# Patient Record
Sex: MALE | Race: WHITE | Employment: FULL TIME | URBAN - METROPOLITAN AREA
[De-identification: names, ages, dates, MRNs, and addresses within clinical notes are randomized per-mention and may not be internally consistent; named-entity substitution may affect disease eponyms.]

---

## 2021-11-22 ENCOUNTER — APPOINTMENT (OUTPATIENT)
Dept: CT IMAGING | Age: 56
DRG: 897 | End: 2021-11-22
Attending: STUDENT IN AN ORGANIZED HEALTH CARE EDUCATION/TRAINING PROGRAM

## 2021-11-22 ENCOUNTER — HOSPITAL ENCOUNTER (INPATIENT)
Age: 56
LOS: 1 days | Discharge: HOME | DRG: 897 | End: 2021-11-23
Attending: STUDENT IN AN ORGANIZED HEALTH CARE EDUCATION/TRAINING PROGRAM | Admitting: INTERNAL MEDICINE
Payer: COMMERCIAL

## 2021-11-22 ENCOUNTER — APPOINTMENT (OUTPATIENT)
Dept: MRI IMAGING | Age: 56
DRG: 897 | End: 2021-11-22
Attending: INTERNAL MEDICINE

## 2021-11-22 DIAGNOSIS — R47.01 EXPRESSIVE APHASIA: ICD-10-CM

## 2021-11-22 DIAGNOSIS — R20.0 LEFT FACIAL NUMBNESS: ICD-10-CM

## 2021-11-22 DIAGNOSIS — I63.9 ACUTE ISCHEMIC STROKE (HCC): Primary | ICD-10-CM

## 2021-11-22 DIAGNOSIS — I10 HYPERTENSION, UNSPECIFIED TYPE: ICD-10-CM

## 2021-11-22 PROBLEM — F10.10 ALCOHOL ABUSE: Status: ACTIVE | Noted: 2021-11-22

## 2021-11-22 PROBLEM — R47.9 SPEECH ABNORMALITY: Status: ACTIVE | Noted: 2021-11-22

## 2021-11-22 PROBLEM — R53.1 WEAKNESS: Status: ACTIVE | Noted: 2021-11-22

## 2021-11-22 LAB
ALBUMIN SERPL-MCNC: 3.8 G/DL (ref 3.5–5)
ALBUMIN/GLOB SERPL: 1.1 {RATIO} (ref 1.1–2.2)
ALP SERPL-CCNC: 83 U/L (ref 45–117)
ALT SERPL-CCNC: 40 U/L (ref 12–78)
ANION GAP SERPL CALC-SCNC: 9 MMOL/L (ref 5–15)
AST SERPL-CCNC: 45 U/L (ref 15–37)
BASOPHILS # BLD: 0.1 K/UL (ref 0–0.1)
BASOPHILS NFR BLD: 1 % (ref 0–1)
BILIRUB SERPL-MCNC: 0.4 MG/DL (ref 0.2–1)
BUN SERPL-MCNC: 7 MG/DL (ref 6–20)
BUN/CREAT SERPL: 9 (ref 12–20)
CALCIUM SERPL-MCNC: 8.8 MG/DL (ref 8.5–10.1)
CHLORIDE SERPL-SCNC: 103 MMOL/L (ref 97–108)
CO2 SERPL-SCNC: 25 MMOL/L (ref 21–32)
COMMENT, HOLDF: NORMAL
CREAT SERPL-MCNC: 0.82 MG/DL (ref 0.7–1.3)
DIFFERENTIAL METHOD BLD: ABNORMAL
EOSINOPHIL # BLD: 0.2 K/UL (ref 0–0.4)
EOSINOPHIL NFR BLD: 2 % (ref 0–7)
ERYTHROCYTE [DISTWIDTH] IN BLOOD BY AUTOMATED COUNT: 12.7 % (ref 11.5–14.5)
GLOBULIN SER CALC-MCNC: 3.5 G/DL (ref 2–4)
GLUCOSE BLD STRIP.AUTO-MCNC: 125 MG/DL (ref 65–117)
GLUCOSE BLD STRIP.AUTO-MCNC: 126 MG/DL (ref 65–117)
GLUCOSE BLD STRIP.AUTO-MCNC: 149 MG/DL (ref 65–117)
GLUCOSE SERPL-MCNC: 138 MG/DL (ref 65–100)
HCT VFR BLD AUTO: 43.9 % (ref 36.6–50.3)
HGB BLD-MCNC: 15.4 G/DL (ref 12.1–17)
IMM GRANULOCYTES # BLD AUTO: 0 K/UL (ref 0–0.04)
IMM GRANULOCYTES NFR BLD AUTO: 0 % (ref 0–0.5)
INR PPP: 1 (ref 0.9–1.1)
LYMPHOCYTES # BLD: 1.4 K/UL (ref 0.8–3.5)
LYMPHOCYTES NFR BLD: 20 % (ref 12–49)
MCH RBC QN AUTO: 31.3 PG (ref 26–34)
MCHC RBC AUTO-ENTMCNC: 35.1 G/DL (ref 30–36.5)
MCV RBC AUTO: 89.2 FL (ref 80–99)
MONOCYTES # BLD: 0.8 K/UL (ref 0–1)
MONOCYTES NFR BLD: 11 % (ref 5–13)
NEUTS SEG # BLD: 4.8 K/UL (ref 1.8–8)
NEUTS SEG NFR BLD: 66 % (ref 32–75)
NRBC # BLD: 0 K/UL (ref 0–0.01)
NRBC BLD-RTO: 0 PER 100 WBC
PLATELET # BLD AUTO: 168 K/UL (ref 150–400)
PMV BLD AUTO: 8.5 FL (ref 8.9–12.9)
POTASSIUM SERPL-SCNC: 3.9 MMOL/L (ref 3.5–5.1)
PROT SERPL-MCNC: 7.3 G/DL (ref 6.4–8.2)
PROTHROMBIN TIME: 10.7 SEC (ref 9–11.1)
RBC # BLD AUTO: 4.92 M/UL (ref 4.1–5.7)
SAMPLES BEING HELD,HOLD: NORMAL
SERVICE CMNT-IMP: ABNORMAL
SODIUM SERPL-SCNC: 137 MMOL/L (ref 136–145)
WBC # BLD AUTO: 7.3 K/UL (ref 4.1–11.1)

## 2021-11-22 PROCEDURE — 4A03X5D MEASUREMENT OF ARTERIAL FLOW, INTRACRANIAL, EXTERNAL APPROACH: ICD-10-PCS | Performed by: INTERNAL MEDICINE

## 2021-11-22 PROCEDURE — 96374 THER/PROPH/DIAG INJ IV PUSH: CPT

## 2021-11-22 PROCEDURE — 70498 CT ANGIOGRAPHY NECK: CPT

## 2021-11-22 PROCEDURE — 99285 EMERGENCY DEPT VISIT HI MDM: CPT

## 2021-11-22 PROCEDURE — 70551 MRI BRAIN STEM W/O DYE: CPT

## 2021-11-22 PROCEDURE — 74011250636 HC RX REV CODE- 250/636: Performed by: STUDENT IN AN ORGANIZED HEALTH CARE EDUCATION/TRAINING PROGRAM

## 2021-11-22 PROCEDURE — 82962 GLUCOSE BLOOD TEST: CPT

## 2021-11-22 PROCEDURE — 65660000000 HC RM CCU STEPDOWN

## 2021-11-22 PROCEDURE — 80053 COMPREHEN METABOLIC PANEL: CPT

## 2021-11-22 PROCEDURE — 0042T CT CODE NEURO PERF W CBF: CPT

## 2021-11-22 PROCEDURE — 80074 ACUTE HEPATITIS PANEL: CPT

## 2021-11-22 PROCEDURE — 85610 PROTHROMBIN TIME: CPT

## 2021-11-22 PROCEDURE — 93005 ELECTROCARDIOGRAM TRACING: CPT

## 2021-11-22 PROCEDURE — 74011250637 HC RX REV CODE- 250/637: Performed by: STUDENT IN AN ORGANIZED HEALTH CARE EDUCATION/TRAINING PROGRAM

## 2021-11-22 PROCEDURE — 74011250636 HC RX REV CODE- 250/636: Performed by: INTERNAL MEDICINE

## 2021-11-22 PROCEDURE — 85025 COMPLETE CBC W/AUTO DIFF WBC: CPT

## 2021-11-22 PROCEDURE — 74011250637 HC RX REV CODE- 250/637: Performed by: INTERNAL MEDICINE

## 2021-11-22 PROCEDURE — 36415 COLL VENOUS BLD VENIPUNCTURE: CPT

## 2021-11-22 PROCEDURE — 74011000636 HC RX REV CODE- 636: Performed by: RADIOLOGY

## 2021-11-22 PROCEDURE — 70450 CT HEAD/BRAIN W/O DYE: CPT

## 2021-11-22 RX ORDER — ACETAMINOPHEN 325 MG/1
650 TABLET ORAL
Status: DISCONTINUED | OUTPATIENT
Start: 2021-11-22 | End: 2021-11-23 | Stop reason: HOSPADM

## 2021-11-22 RX ORDER — LORAZEPAM 2 MG/ML
4 INJECTION INTRAMUSCULAR
Status: DISCONTINUED | OUTPATIENT
Start: 2021-11-22 | End: 2021-11-23 | Stop reason: HOSPADM

## 2021-11-22 RX ORDER — GUAIFENESIN 100 MG/5ML
81 LIQUID (ML) ORAL DAILY
Status: DISCONTINUED | OUTPATIENT
Start: 2021-11-23 | End: 2021-11-23 | Stop reason: HOSPADM

## 2021-11-22 RX ORDER — LABETALOL HCL 20 MG/4 ML
10 SYRINGE (ML) INTRAVENOUS
Status: COMPLETED | OUTPATIENT
Start: 2021-11-22 | End: 2021-11-22

## 2021-11-22 RX ORDER — FOLIC ACID 1 MG/1
1 TABLET ORAL DAILY
Status: DISCONTINUED | OUTPATIENT
Start: 2021-11-22 | End: 2021-11-23 | Stop reason: HOSPADM

## 2021-11-22 RX ORDER — PHENOBARBITAL SODIUM 65 MG/ML
65 INJECTION INTRAMUSCULAR 3 TIMES DAILY
Status: DISCONTINUED | OUTPATIENT
Start: 2021-11-22 | End: 2021-11-23

## 2021-11-22 RX ORDER — METOPROLOL SUCCINATE 100 MG/1
100 TABLET, EXTENDED RELEASE ORAL DAILY
COMMUNITY

## 2021-11-22 RX ORDER — METFORMIN HYDROCHLORIDE 500 MG/1
500 TABLET ORAL
COMMUNITY

## 2021-11-22 RX ORDER — ACETAMINOPHEN 650 MG/1
650 SUPPOSITORY RECTAL
Status: DISCONTINUED | OUTPATIENT
Start: 2021-11-22 | End: 2021-11-23 | Stop reason: HOSPADM

## 2021-11-22 RX ORDER — ASPIRIN 325 MG/1
100 TABLET, FILM COATED ORAL DAILY
Status: DISCONTINUED | OUTPATIENT
Start: 2021-11-22 | End: 2021-11-23 | Stop reason: HOSPADM

## 2021-11-22 RX ORDER — METOPROLOL SUCCINATE 25 MG/1
25 TABLET, EXTENDED RELEASE ORAL DAILY
COMMUNITY
End: 2021-11-22

## 2021-11-22 RX ORDER — LISINOPRIL 10 MG/1
10 TABLET ORAL DAILY
COMMUNITY

## 2021-11-22 RX ORDER — DEXTROSE 50 % IN WATER (D50W) INTRAVENOUS SYRINGE
12.5-25 AS NEEDED
Status: DISCONTINUED | OUTPATIENT
Start: 2021-11-22 | End: 2021-11-23 | Stop reason: HOSPADM

## 2021-11-22 RX ORDER — DIAZEPAM 10 MG/2ML
5 INJECTION INTRAMUSCULAR
Status: COMPLETED | OUTPATIENT
Start: 2021-11-22 | End: 2021-11-22

## 2021-11-22 RX ORDER — GUAIFENESIN 100 MG/5ML
324 LIQUID (ML) ORAL
Status: COMPLETED | OUTPATIENT
Start: 2021-11-22 | End: 2021-11-22

## 2021-11-22 RX ORDER — LABETALOL HCL 20 MG/4 ML
5 SYRINGE (ML) INTRAVENOUS
Status: DISCONTINUED | OUTPATIENT
Start: 2021-11-22 | End: 2021-11-23 | Stop reason: HOSPADM

## 2021-11-22 RX ORDER — INSULIN LISPRO 100 [IU]/ML
INJECTION, SOLUTION INTRAVENOUS; SUBCUTANEOUS
Status: DISCONTINUED | OUTPATIENT
Start: 2021-11-22 | End: 2021-11-23 | Stop reason: HOSPADM

## 2021-11-22 RX ORDER — MAGNESIUM SULFATE 100 %
4 CRYSTALS MISCELLANEOUS AS NEEDED
Status: DISCONTINUED | OUTPATIENT
Start: 2021-11-22 | End: 2021-11-23 | Stop reason: HOSPADM

## 2021-11-22 RX ORDER — LORAZEPAM 2 MG/ML
2 INJECTION INTRAMUSCULAR
Status: DISCONTINUED | OUTPATIENT
Start: 2021-11-22 | End: 2021-11-23 | Stop reason: HOSPADM

## 2021-11-22 RX ADMIN — Medication 100 MG: at 17:13

## 2021-11-22 RX ADMIN — ASPIRIN 324 MG: 81 TABLET, CHEWABLE ORAL at 17:11

## 2021-11-22 RX ADMIN — IOPAMIDOL 100 ML: 755 INJECTION, SOLUTION INTRAVENOUS at 15:29

## 2021-11-22 RX ADMIN — PHENOBARBITAL SODIUM 65 MG: 65 INJECTION INTRAMUSCULAR at 22:44

## 2021-11-22 RX ADMIN — LABETALOL HYDROCHLORIDE 10 MG: 5 INJECTION, SOLUTION INTRAVENOUS at 14:40

## 2021-11-22 RX ADMIN — FOLIC ACID 1 MG: 1 TABLET ORAL at 17:13

## 2021-11-22 RX ADMIN — PHENOBARBITAL SODIUM 65 MG: 65 INJECTION INTRAMUSCULAR at 17:14

## 2021-11-22 RX ADMIN — DIAZEPAM 5 MG: 5 INJECTION, SOLUTION INTRAMUSCULAR; INTRAVENOUS at 17:07

## 2021-11-22 NOTE — PROGRESS NOTES
Spiritual Care Assessment/Progress Note  [address]      NAME: Yao Lopez      MRN: [id number]  AGE: 60 y.o. SEX: male  Mosque Affiliation:    Language:      11/22/2021     Total Time (in minutes): 14     Spiritual Assessment begun in [facility] EMERGENCY DEPT through conversation with:         []Patient        [] Family    [] Friend(s)        Reason for Consult: Other (comment) (Code Stroke)     Spiritual beliefs: (Please include comment if needed)     [] Identifies with a lashon tradition:         [] Supported by a lashon community:            [] Claims no spiritual orientation:           [] Seeking spiritual identity:                [] Adheres to an individual form of spirituality:           [x] Not able to assess:                           Identified resources for coping:      [] Prayer                               [] Music                  [] Guided Imagery     [] Family/friends                 [] Pet visits     [] Devotional reading                         [x] Unknown     [] Other:                                        Interventions offered during this visit: (See comments for more details)    Patient Interventions: Other (comment) (Unable to assess)           Plan of Care:     [] Support spiritual and/or cultural needs    [] Support AMD and/or advance care planning process      [] Support grieving process   [] Coordinate Rites and/or Rituals    [] Coordination with community clergy   [] No spiritual needs identified at this time   [] Detailed Plan of Care below (See Comments)  [] Make referral to Music Therapy  [] Make referral to Pet Therapy     [] Make referral to Addiction services  [] Make referral to [facility]  [] Make referral to Spiritual Care Partner  [] No future visits requested        [x] Contact Spiritual Care for further referrals     Comments:  responded to a Code Stroke for Mr. Bety Carroll in the ER.  Mr. Bety Carroll was assessed by the physician and quickly went to CT for testing. Unable to assess at this time. 's are available for further support upon referral  Katiuska Tyler. Elian Garcia.      Paging Service: [phone])

## 2021-11-22 NOTE — ED TRIAGE NOTES
Signs and symptoms: Slurred speech/aphasia, left facial numbness, bilateral leg numbness  VAN score: Negative  Last Known Well: 1100  Blood Glucose (EMS acceptable): 115   Blood Pressure (EMS acceptable): 210/100  Anticoagulants: none     Code Stroke Level 1 initiated at this time. SBAR handoff provided to N/A. This RN transporting pt to CT at this time. Will hand off care to Brenda Valente, primary RN once pt returned to ER.

## 2021-11-22 NOTE — H&P
Pete Issa david [place] [address]  [address] [facility], [address] [place] [address]  [phone]    Admission History and Physical      NAME:  Molly Martino   :   1965   MRN:  [id number]     PCP:  Tiffanie Colby MD     Date/Time of service:  2021          Subjective:     CHIEF COMPLAINT: Left-sided facial numbness    HISTORY OF PRESENT ILLNESS:     Mr. Gilford Coats is a 60 y.o.   male past medical history of diabetes, hypertension and alcohol abuse who is admitted for stroke rule out. Mr. 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 He also endorses feeling lightheaded and bilateral weakness in his lower extremities. He denied any syncopal episodes, denies any chest pain, denies any focal weakness. He does endorse to drinking 8-12 beers daily. He states his last drink was last night. He endorses some mild tremors, mild headache; no hallucinations. No Known Allergies    Prior to Admission medications    Not on File       Past Medical History:   Diagnosis Date    [facility]     Diabetes ([facility]     HTN (hypertension)         Past Surgical History:   Procedure Laterality Date    HX OTHER SURGICAL      Right sided jaw surgery       Social History     Tobacco Use    Smoking status: Former Smoker    Smokeless tobacco: Never Used   Substance Use Topics    Alcohol use:  Yes     Alcohol/week: 70.0 standard drinks     Types: 70 Cans of beer per week        Family History   Problem Relation Age of Onset    Diabetes Mother     Hypertension Father         Review of Systems:  Per HPI             Objective:      VITALS:    Vital signs reviewed; most recent are:    Visit Vitals  BP (!) 189/119 (BP 1 Location: Left upper arm, BP Patient Position: At rest)   Pulse 81   Temp 98.9 °F (37.2 °C)   Resp 20   Ht 6' 1\" (1.854 m)   Wt 105.3 kg (232 lb 2.3 oz)   SpO2 96%   BMI 30.63 kg/m²     SpO2 Readings from Last 6 Encounters:   11/22/21 96%        No intake or output data in the 24 hours ending 11/22/21 1651     Exam:     Physical Exam:    Gen:  Well-developed, well-nourished, in no acute distress  HEENT:  Pink conjunctivae, PERRL, hearing intact to voice, moist mucous membranes  Neck:  Supple, no tenderness to palpation   Resp:  No accessory muscle use, clear breath sounds without wheezes rales or rhonchi  Card:  RRR, No murmurs, normal S1, S2, no peripheral edema  Abd:  Soft, non-tender, non-distended, normoactive bowel sounds are present, no palpable organomegaly   Lymph:  No cervical adenopathy  Musc:  No cyanosis or clubbing  Skin:  No rashes or ulcers, skin turgor is good  Neuro:  Cranial nerves 3-12 are grossly intact, strength 5/5 bilaterally UEs and LEs, follows commands appropriately  Psych:  Oriented to person, place, and time, Alert with good insight      Labs:    Recent Labs     11/22/21  1432   WBC 7.3   HGB 15.4   HCT 43.9        Recent Labs     11/22/21  1432      K 3.9      CO2 25   *   BUN 7   CREA 0.82   CA 8.8   ALB 3.8   TBILI 0.4   ALT 40     Lab Results   Component Value Date/Time    Glucose (POC) 149 (H) 11/22/2021 02:50 PM     No results for input(s): PH, PCO2, PO2, HCO3, FIO2 in the last 72 hours. Recent Labs     11/22/21  1432   INR 1.0       Radiology and EKG reviewed: CT head without any acute concerns, CTA head and neck with no significant stenosis or occlusions. EKG sinus with some occasional PVCs     Old Records reviewed in [facility]       Assessment/Plan:           Left facial numbness (11/22/2021)/ Speech abnormality (11/22/2021)/ Weakness (11/22/2021): Concern for stroke. CT head without any acute concerns. CTA head and neck with no significant stenosis or occlusions. Obtain Mri head wo. Obtain echo. Obtain UDS. Obtain A1C and lipid panel. Daily aspirin. Consult neurology.  Speech/PT/OT      Alcohol abuse (11/22/2021): endorses 8-12 beers daily. Last drink about 24 hours ago; now with mild sx. Start phenobarbital. CIWA with IV ativan PRN. Oral folic acid and thiamine. HTN: allow permissive HTN. Hold home metoprolol and lisinopril for now. Diabetes: hold home metormin. ISS.            Risk of deterioration: high      Total time spent with patient: 48 Minutes **I personally saw and examined the patient during this time period**                 Care Plan discussed with: Patient and Nursing Staff    Discussed:  Code Status and Care Plan    Prophylaxis:  SCD's    Probable Disposition:  Home w/Family           ___________________________________________________    Attending Physician: Dominic Sarah DO

## 2021-11-22 NOTE — ED PROVIDER NOTES
Chief Complaint   Patient presents with    Numbness    Aphasia     This is a 53-year-old male with a history of diabetes presenting by EMS for slurred speech, expressive aphasia, and left sided facial numbness that started abruptly at 1100 (when he was last know normal). He was driving down [address] when he started feeling lightheaded like he was about to pass out, pulled over at a car dealership at which time EMS was called. Accucheck in the field was normal, last blood pressure in the field was 741'P systolic. He continues to have speech deficits although medics felt that there was slight improvement on arrival compared to his initial exam.  Reports continued left sided facial numbness, speech deficits, also reports bilateral lower extremity weakness. Denies any history of prior stroke. Not anticoagulated. No headache, neck pain, vision loss, or vomiting. No other systemic complaints. Symptoms are moderate in nature without alleviating factors. Past Medical History:   Diagnosis Date    [facility]     Diabetes ([facility]     HTN (hypertension)        Past Surgical History:   Procedure Laterality Date    HX OTHER SURGICAL      Right sided jaw surgery         History reviewed. No pertinent family history. Social History     Socioeconomic History    Marital status:      Spouse name: Not on file    Number of children: Not on file    Years of education: Not on file    Highest education level: Not on file   Occupational History    Not on file   Tobacco Use    Smoking status: Former Smoker    Smokeless tobacco: Never Used   Substance and Sexual Activity    Alcohol use:  Yes     Alcohol/week: 70.0 standard drinks     Types: 70 Cans of beer per week    Drug use: Never    Sexual activity: Not on file   Other Topics Concern    Not on file   Social History Narrative    Not on file     Social Determinants of Health     Financial Resource Strain:     Difficulty of Paying Living Expenses: Not on file   Food Insecurity:     Worried About Running Out of Food in the Last Year: Not on file    Brandy of Food in the Last Year: Not on file   Transportation Needs:     Lack of Transportation (Medical): Not on file    Lack of Transportation (Non-Medical): Not on file   Physical Activity:     Days of Exercise per Week: Not on file    Minutes of Exercise per Session: Not on file   Stress:     Feeling of Stress : Not on file   Social Connections:     Frequency of Communication with Friends and Family: Not on file    Frequency of Social Gatherings with Friends and Family: Not on file    Attends Jewish Services: Not on file    Active Member of [address] [place] [address] [email or web address] or Organizations: Not on file    Attends Club or Organization Meetings: Not on file    Marital Status: Not on file   Intimate Partner Violence:     Fear of Current or Ex-Partner: Not on file    Emotionally Abused: Not on file    Physically Abused: Not on file    Sexually Abused: Not on file   Housing Stability:     Unable to Pay for Housing in the Last Year: Not on file    Number of [place] in the Last Year: Not on file    Unstable Housing in the Last Year: Not on file         ALLERGIES: Patient has no known allergies. Review of Systems   Constitutional: Negative for fever. HENT: Negative for facial swelling. Eyes: Negative for redness. Respiratory: Negative for shortness of breath. Cardiovascular: Negative for chest pain. Gastrointestinal: Negative for abdominal pain and vomiting. Musculoskeletal: Negative for neck pain. Skin: Negative for color change. Neurological: Positive for numbness. Psychiatric/Behavioral: Negative for confusion.        Vitals:    11/22/21 1427 11/22/21 1437 11/22/21 1440   BP: (!) 189/119     Pulse: 91  81   Resp: 20     Temp: 98.9 °F (37.2 °C)     SpO2: 94% 96%    Weight: 105.3 kg (232 lb 2.3 oz)     Height: 6' 1\" (1.854 m)              Physical Exam  General:  Awake and alert, NAD  HEENT:  NC/AT, equal pupils, moist mucous membranes  Neck:   Normal inspection, full range of motion  Cardiac:  RRR, no murmurs  Respiratory:  Clear bilaterally, no wheezes, rales, rhonchi  Abdomen:  Soft and nontender, nondistended  Extremities: Warm and well perfused, no peripheral edema  Neuro:  +Mild dysarthria and expressive aphasia, reports left sided facial numbness, exhibits left pronator drift, bilateral lower extremity weakness (strength 4/5 bilaterally, but weaker on the left)  Skin:   No rashes or pallor    RESULTS  Recent Results (from the past 12 hour(s))   CBC WITH AUTOMATED DIFF    Collection Time: 11/22/21  2:32 PM   Result Value Ref Range    WBC 7.3 4.1 - 11.1 K/uL    RBC 4.92 4.10 - 5.70 M/uL    HGB 15.4 12.1 - 17.0 g/dL    HCT 43.9 36.6 - 50.3 %    MCV 89.2 80.0 - 99.0 FL    MCH 31.3 26.0 - 34.0 PG    MCHC 35.1 30.0 - 36.5 g/dL    RDW 12.7 11.5 - 14.5 %    PLATELET 729 974 - 207 K/uL    MPV 8.5 (L) 8.9 - 12.9 FL    NRBC 0.0 0  WBC    ABSOLUTE NRBC 0.00 0.00 - 0.01 K/uL    NEUTROPHILS 66 32 - 75 %    LYMPHOCYTES 20 12 - 49 %    MONOCYTES 11 5 - 13 %    EOSINOPHILS 2 0 - 7 %    BASOPHILS 1 0 - 1 %    IMMATURE GRANULOCYTES 0 0.0 - 0.5 %    ABS. NEUTROPHILS 4.8 1.8 - 8.0 K/UL    ABS. LYMPHOCYTES 1.4 0.8 - 3.5 K/UL    ABS. MONOCYTES 0.8 0.0 - 1.0 K/UL    ABS. EOSINOPHILS 0.2 0.0 - 0.4 K/UL    ABS. BASOPHILS 0.1 0.0 - 0.1 K/UL    ABS. IMM.  GRANS. 0.0 0.00 - 0.04 K/UL    DF AUTOMATED     METABOLIC PANEL, COMPREHENSIVE    Collection Time: 11/22/21  2:32 PM   Result Value Ref Range    Sodium 137 136 - 145 mmol/L    Potassium 3.9 3.5 - 5.1 mmol/L    Chloride 103 97 - 108 mmol/L    CO2 25 21 - 32 mmol/L    Anion gap 9 5 - 15 mmol/L    Glucose 138 (H) 65 - 100 mg/dL    BUN 7 6 - 20 MG/DL    Creatinine 0.82 0.70 - 1.30 MG/DL    BUN/Creatinine ratio 9 (L) 12 - 20      GFR est AA >60 >60 ml/min/1.73m2    GFR est non-AA >60 >60 ml/min/1.73m2    Calcium 8.8 8.5 - 10.1 MG/DL    Bilirubin, total 0.4 0.2 - 1.0 MG/DL    ALT (SGPT) 40 12 - 78 U/L    AST (SGOT) 45 (H) 15 - 37 U/L    Alk. phosphatase 83 45 - 117 U/L    Protein, total 7.3 6.4 - 8.2 g/dL    Albumin 3.8 3.5 - 5.0 g/dL    Globulin 3.5 2.0 - 4.0 g/dL    A-G Ratio 1.1 1.1 - 2.2     PROTHROMBIN TIME + INR    Collection Time: 11/22/21  2:32 PM   Result Value Ref Range    INR 1.0 0.9 - 1.1      Prothrombin time 10.7 9.0 - 11.1 sec   SAMPLES BEING HELD    Collection Time: 11/22/21  2:32 PM   Result Value Ref Range    SAMPLES BEING HELD 1SST,1RED,1DRK GRN     COMMENT        Add-on orders for these samples will be processed based on acceptable specimen integrity and analyte stability, which may vary by analyte. GLUCOSE, POC    Collection Time: 11/22/21  2:50 PM   Result Value Ref Range    Glucose (POC) 149 (H) 65 - 117 mg/dL    Performed by Sarah Stout    EKG, 12 LEAD, INITIAL    Collection Time: 11/22/21  2:52 PM   Result Value Ref Range    Ventricular Rate 85 BPM    Atrial Rate 85 BPM    P-R Interval 158 ms    QRS Duration 154 ms    Q-T Interval 420 ms    QTC Calculation (Bezet) 499 ms    Calculated P Axis 46 degrees    Calculated R Axis -21 degrees    Calculated T Axis 114 degrees    Diagnosis       Sinus rhythm with occasional premature ventricular complexes  Left bundle branch block  Abnormal ECG  No previous ECGs available          IMAGING  CTA CODE NEURO HEAD AND NECK W CONT    Result Date: 11/22/2021  CTA Head: 1. No evidence of significant stenosis or aneurysm. CTA Neck: 1. No evidence of significant stenosis. CT Brain Perfusion: 1. No acute abnormality. CT CODE NEURO HEAD WO CONTRAST    Result Date: 11/22/2021  1. No evidence of acute intracranial abnormality. CT CODE NEURO PERF W CBF    Result Date: 11/22/2021  CTA Head: 1. No evidence of significant stenosis or aneurysm. CTA Neck: 1. No evidence of significant stenosis. CT Brain Perfusion: 1. No acute abnormality.        Procedures - none unless documented below  EKG as interpreted by me: Normal sinus rhythm at a rate of 85, left axis deviation, left bundle branch block, no ischemic changes by Sgarbossa criteria. ED course: Labs, EKG and imaging reviewed. NIHSS score of 5 on my initial exam.  Received a call back from teleneurology Neyda Tao at 2860, discussed his initial presentation while the patient was having his head CT performed, communicated that he is a candidate for systemic TPA given positive NIH stroke score with potentially disabling neurologic deficits on exam, and last known normal time <4.5 hours prior to arrival.  Labetalol 10 mg IV given for diastolic blood pressure of 119 with possibility of needing TPA. Discussed again with Dr. Lakeshia Rushing at 4100, given rapidly resolving deficits (NIHSS of 1 at the time of his exam) he did not feel the patient was an appropriate candidate for thrombolytics based on risk-benefit. CT head/CTA head and neck unremarkable, I've ordered aspirin 324 mg. Will admit for continued management including MRI and stroke investigation, discussed with the hospitalist.    Hospitalist Chris Serve for Admission  4:13 PM    ED Room Number:   ER10/10  Patient Name and age:  Joselo Levi 60 y.o.  male  Working Diagnosis:     1. Acute ischemic stroke ([address]    2. Hypertension, unspecified type    3. Expressive aphasia    4. Left facial numbness      COVID suspicion:   no  Code Status:    Full Code  Readmission:    no  Isolation Requirements:  no  Recommended Level of Care: telemetry  Department:    [facility] ED - [phone]  Other:     Given rapidly resolving deficits, teleneurology did not feel he was an appropriate candidate for thrombolytics. CT head/CTA head and neck unremarkable, aspirin given, needs admission for MRI and stroke investigation.

## 2021-11-23 ENCOUNTER — APPOINTMENT (OUTPATIENT)
Dept: NON INVASIVE DIAGNOSTICS | Age: 56
DRG: 897 | End: 2021-11-23
Attending: INTERNAL MEDICINE

## 2021-11-23 VITALS
BODY MASS INDEX: 29.51 KG/M2 | HEIGHT: 73 IN | TEMPERATURE: 98.9 F | WEIGHT: 222.66 LBS | SYSTOLIC BLOOD PRESSURE: 132 MMHG | DIASTOLIC BLOOD PRESSURE: 70 MMHG | RESPIRATION RATE: 17 BRPM | OXYGEN SATURATION: 94 % | HEART RATE: 70 BPM

## 2021-11-23 LAB
ALBUMIN SERPL-MCNC: 3.6 G/DL (ref 3.5–5)
ALBUMIN/GLOB SERPL: 1.1 {RATIO} (ref 1.1–2.2)
ALP SERPL-CCNC: 73 U/L (ref 45–117)
ALT SERPL-CCNC: 36 U/L (ref 12–78)
AMPHET UR QL SCN: NEGATIVE
ANION GAP SERPL CALC-SCNC: 8 MMOL/L (ref 5–15)
AST SERPL-CCNC: 35 U/L (ref 15–37)
AV R PG: 53.94 MMHG
BARBITURATES UR QL SCN: NEGATIVE
BENZODIAZ UR QL: NEGATIVE
BILIRUB SERPL-MCNC: 0.9 MG/DL (ref 0.2–1)
BUN SERPL-MCNC: 7 MG/DL (ref 6–20)
BUN/CREAT SERPL: 9 (ref 12–20)
CALCIUM SERPL-MCNC: 9.1 MG/DL (ref 8.5–10.1)
CANNABINOIDS UR QL SCN: NEGATIVE
CHLORIDE SERPL-SCNC: 105 MMOL/L (ref 97–108)
CHOLEST SERPL-MCNC: 219 MG/DL
CO2 SERPL-SCNC: 26 MMOL/L (ref 21–32)
COCAINE UR QL SCN: NEGATIVE
CREAT SERPL-MCNC: 0.77 MG/DL (ref 0.7–1.3)
DRUG SCRN COMMENT,DRGCM: NORMAL
ECHO AO ASC DIAM: 3.59 CM
ECHO AR MAX VEL PISA: 367.21 CM/S
ECHO AV ANNULUS DIAM: 4.06 CM
ECHO AV AREA PEAK VELOCITY: 3.88 CM2
ECHO AV AREA/BSA PEAK VELOCITY: 1.7 CM2/M2
ECHO AV PEAK GRADIENT: 8.49 MMHG
ECHO AV PEAK VELOCITY: 145.52 CM/S
ECHO AV REGURGITANT PHT: 806.11 MS
ECHO IVC PROX: 2.06 CM
ECHO LA AREA 4C: 14.45 CM2
ECHO LA MAJOR AXIS: 3.54 CM
ECHO LA MINOR AXIS: 1.57 CM
ECHO LA VOL 2C: 44.19 ML (ref 18–58)
ECHO LA VOL 4C: 36.1 ML (ref 18–58)
ECHO LA VOL BP: 45.87 ML (ref 18–58)
ECHO LA VOL/BSA BIPLANE: 20.39 ML/M2 (ref 16–28)
ECHO LA VOLUME INDEX A2C: 19.64 ML/M2 (ref 16–28)
ECHO LA VOLUME INDEX A4C: 16.04 ML/M2 (ref 16–28)
ECHO LV E' LATERAL VELOCITY: 6.66 CM/S
ECHO LV E' SEPTAL VELOCITY: 6.13 CM/S
ECHO LV INTERNAL DIMENSION DIASTOLIC: 5.76 CM (ref 4.2–5.9)
ECHO LV INTERNAL DIMENSION SYSTOLIC: 3.16 CM
ECHO LV IVSD: 1.01 CM (ref 0.6–1)
ECHO LV MASS 2D: 233.4 G (ref 88–224)
ECHO LV MASS INDEX 2D: 103.7 G/M2 (ref 49–115)
ECHO LV POSTERIOR WALL DIASTOLIC: 1.01 CM (ref 0.6–1)
ECHO LVOT CARDIAC OUTPUT: 22.99 LITER/MINUTE
ECHO LVOT DIAM: 2.39 CM
ECHO LVOT PEAK GRADIENT: 6.31 MMHG
ECHO LVOT PEAK VELOCITY: 125.61 CM/S
ECHO LVOT SV: 110.9 ML
ECHO LVOT VTI: 24.65 CM
ECHO MV A VELOCITY: 84.95 CM/S
ECHO MV E DECELERATION TIME (DT): 230.85 MS
ECHO MV E VELOCITY: 55.13 CM/S
ECHO MV E/A RATIO: 0.65
ECHO MV E/E' LATERAL: 8.28
ECHO MV E/E' RATIO (AVERAGED): 8.64
ECHO MV E/E' SEPTAL: 8.99
ECHO PV MAX VELOCITY: 133.12 CM/S
ECHO PV PEAK INSTANTANEOUS GRADIENT SYSTOLIC: 7.09 MMHG
ECHO RV INTERNAL DIMENSION: 3.7 CM
ECHO RV TAPSE: 1.81 CM (ref 1.5–2)
ECHO TV REGURGITANT MAX VELOCITY: 232.8 CM/S
ECHO TV REGURGITANT PEAK GRADIENT: 21.68 MMHG
ERYTHROCYTE [DISTWIDTH] IN BLOOD BY AUTOMATED COUNT: 12.9 % (ref 11.5–14.5)
EST. AVERAGE GLUCOSE BLD GHB EST-MCNC: 143 MG/DL
GLOBULIN SER CALC-MCNC: 3.2 G/DL (ref 2–4)
GLUCOSE BLD STRIP.AUTO-MCNC: 129 MG/DL (ref 65–117)
GLUCOSE BLD STRIP.AUTO-MCNC: 139 MG/DL (ref 65–117)
GLUCOSE BLD STRIP.AUTO-MCNC: 171 MG/DL (ref 65–117)
GLUCOSE SERPL-MCNC: 130 MG/DL (ref 65–100)
HAV IGM SER QL: NONREACTIVE
HBA1C MFR BLD: 6.6 % (ref 4–5.6)
HBV CORE IGM SER QL: NONREACTIVE
HBV SURFACE AG SER QL: <0.1 INDEX
HBV SURFACE AG SER QL: NEGATIVE
HCT VFR BLD AUTO: 43.8 % (ref 36.6–50.3)
HCV AB SERPL QL IA: NONREACTIVE
HDLC SERPL-MCNC: 67 MG/DL
HDLC SERPL: 3.3 {RATIO} (ref 0–5)
HGB BLD-MCNC: 15.2 G/DL (ref 12.1–17)
LA VOL DISK BP: 40.46 ML (ref 18–58)
LDLC SERPL CALC-MCNC: 124 MG/DL (ref 0–100)
LVOT MG: 3.47 MMHG
MCH RBC QN AUTO: 31.1 PG (ref 26–34)
MCHC RBC AUTO-ENTMCNC: 34.7 G/DL (ref 30–36.5)
MCV RBC AUTO: 89.8 FL (ref 80–99)
METHADONE UR QL: NEGATIVE
NRBC # BLD: 0 K/UL (ref 0–0.01)
NRBC BLD-RTO: 0 PER 100 WBC
OPIATES UR QL: NEGATIVE
PCP UR QL: NEGATIVE
PLATELET # BLD AUTO: 167 K/UL (ref 150–400)
PMV BLD AUTO: 9.3 FL (ref 8.9–12.9)
POTASSIUM SERPL-SCNC: 3.7 MMOL/L (ref 3.5–5.1)
PROT SERPL-MCNC: 6.8 G/DL (ref 6.4–8.2)
RBC # BLD AUTO: 4.88 M/UL (ref 4.1–5.7)
SERVICE CMNT-IMP: ABNORMAL
SODIUM SERPL-SCNC: 139 MMOL/L (ref 136–145)
SP1: NORMAL
SP2: NORMAL
SP3: NORMAL
TRIGL SERPL-MCNC: 140 MG/DL (ref ?–150)
VLDLC SERPL CALC-MCNC: 28 MG/DL
WBC # BLD AUTO: 7.3 K/UL (ref 4.1–11.1)

## 2021-11-23 PROCEDURE — 96374 THER/PROPH/DIAG INJ IV PUSH: CPT

## 2021-11-23 PROCEDURE — 80061 LIPID PANEL: CPT

## 2021-11-23 PROCEDURE — 99223 1ST HOSP IP/OBS HIGH 75: CPT | Performed by: PSYCHIATRY & NEUROLOGY

## 2021-11-23 PROCEDURE — 74011250636 HC RX REV CODE- 250/636: Performed by: INTERNAL MEDICINE

## 2021-11-23 PROCEDURE — 80307 DRUG TEST PRSMV CHEM ANLYZR: CPT

## 2021-11-23 PROCEDURE — 82962 GLUCOSE BLOOD TEST: CPT

## 2021-11-23 PROCEDURE — 74011636637 HC RX REV CODE- 636/637: Performed by: INTERNAL MEDICINE

## 2021-11-23 PROCEDURE — 80053 COMPREHEN METABOLIC PANEL: CPT

## 2021-11-23 PROCEDURE — 74011250637 HC RX REV CODE- 250/637: Performed by: INTERNAL MEDICINE

## 2021-11-23 PROCEDURE — 97530 THERAPEUTIC ACTIVITIES: CPT

## 2021-11-23 PROCEDURE — 97112 NEUROMUSCULAR REEDUCATION: CPT

## 2021-11-23 PROCEDURE — 97535 SELF CARE MNGMENT TRAINING: CPT

## 2021-11-23 PROCEDURE — 85027 COMPLETE CBC AUTOMATED: CPT

## 2021-11-23 PROCEDURE — 97161 PT EVAL LOW COMPLEX 20 MIN: CPT

## 2021-11-23 PROCEDURE — 93306 TTE W/DOPPLER COMPLETE: CPT | Performed by: INTERNAL MEDICINE

## 2021-11-23 PROCEDURE — 36415 COLL VENOUS BLD VENIPUNCTURE: CPT

## 2021-11-23 PROCEDURE — 83036 HEMOGLOBIN GLYCOSYLATED A1C: CPT

## 2021-11-23 PROCEDURE — 97165 OT EVAL LOW COMPLEX 30 MIN: CPT

## 2021-11-23 RX ORDER — LISINOPRIL 5 MG/1
10 TABLET ORAL DAILY
Status: DISCONTINUED | OUTPATIENT
Start: 2021-11-23 | End: 2021-11-23 | Stop reason: HOSPADM

## 2021-11-23 RX ORDER — METOPROLOL SUCCINATE 50 MG/1
100 TABLET, EXTENDED RELEASE ORAL DAILY
Status: DISCONTINUED | OUTPATIENT
Start: 2021-11-23 | End: 2021-11-23 | Stop reason: HOSPADM

## 2021-11-23 RX ADMIN — Medication 100 MG: at 09:51

## 2021-11-23 RX ADMIN — METOPROLOL SUCCINATE 100 MG: 50 TABLET, EXTENDED RELEASE ORAL at 12:12

## 2021-11-23 RX ADMIN — INSULIN LISPRO 2 UNITS: 100 INJECTION, SOLUTION INTRAVENOUS; SUBCUTANEOUS at 12:12

## 2021-11-23 RX ADMIN — PERFLUTREN 2 ML: 6.52 INJECTION, SUSPENSION INTRAVENOUS at 11:28

## 2021-11-23 RX ADMIN — FOLIC ACID 1 MG: 1 TABLET ORAL at 09:51

## 2021-11-23 RX ADMIN — LISINOPRIL 10 MG: 5 TABLET ORAL at 12:13

## 2021-11-23 RX ADMIN — ASPIRIN 81 MG: 81 TABLET, CHEWABLE ORAL at 09:51

## 2021-11-23 RX ADMIN — PHENOBARBITAL SODIUM 65 MG: 65 INJECTION INTRAMUSCULAR at 09:51

## 2021-11-23 NOTE — CONSULTS
013 [facility] and [address] at [facility] at [facility] [address], [address] [facility] [address] [facility], [address] [place] [address]  [phone] Office  [phone] Facsimile           Referring: Dr. Leora Wu    Chief Complaint   Patient presents with    Numbness    Aphasia     We are asked to see this 57-year-old gentleman in neurologic consultation regarding slurred speech and left-sided numbness question stroke. He presented to the emergency department with slurred speech difficulty getting out of his words left-sided facial numbness starting at 11:00 while driving. He felt like he was going to pass out. Apparently had a normal Accu-Chek in the field with a blood pressure in the 140s. In the emergency department his examination demonstrated left pronator drift and bilateral lower extremity weakness but worse on the left. 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 He did forget to take his medicine yesterday morning but he has done that in the past on occasion and just took it when he got home and had no effects from it. In any regard he just suddenly started to feel dizzy described as lightheaded. He felt like he was going to pass out. He had some numbness to the left side of his face. He felt weak in all of his extremities. He felt so lightheaded that he again thought he was going to pass out so he pulled over on the side of the road and had EMS summoned. He sat on the side of the road until EMS got there.   He started to feel better in the emergency department. He has never had this happen before. He did not lose consciousness. There was nothing different about yesterday except for getting his medicine. His sugars have been running well he says. He has not been ill. No injury. He did nothing different yesterday. He denies any hemibody weakness.       Past Medical History:   Diagnosis Date    [facility]ism ([facility]     Diabetes ([facility]     HTN (hypertension)        Past Surgical History:   Procedure Laterality Date    HX OTHER SURGICAL      Right sided jaw surgery       Current Facility-Administered Medications   Medication Dose Route Frequency Provider Last Rate Last Admin    acetaminophen (TYLENOL) tablet 650 mg  650 mg Oral Q4H PRN Brunilda Corpus, DO        Or    acetaminophen (TYLENOL) solution 650 mg  650 mg Per NG tube Q4H PRN Brunilda Corpus, DO        Or    acetaminophen (TYLENOL) suppository 650 mg  650 mg Rectal Q4H PRN Brunilda Corpus, DO        aspirin chewable tablet 81 mg  81 mg Oral DAILY Al, Darline, DO        labetaloL (NORMODYNE;TRANDATE) 20 mg/4 mL (5 mg/mL) injection 5 mg  5 mg IntraVENous Q10MIN PRN Al, Darline, DO        PHENobarbital (LUMINAL) injection 65 mg  65 mg IntraVENous TID Brunilda Corpus, DO   65 mg at 11/22/21 2244    LORazepam (ATIVAN) injection 2 mg  2 mg IntraVENous Q1H PRN Brunilda Corpus, DO        LORazepam (ATIVAN) injection 4 mg  4 mg IntraVENous Q1H PRN Brunilda Corpus, DO        folic acid (FOLVITE) tablet 1 mg  1 mg Oral DAILY Al, Aidan Locust, DO   1 mg at 11/22/21 1713    thiamine mononitrate (B-1) tablet 100 mg  100 mg Oral DAILY Brunilda Corpus, DO   100 mg at 11/22/21 1713    insulin lispro (HUMALOG) injection   SubCUTAneous QID WITH MEALS Brunilda Corpus, DO        glucose chewable tablet 16 g  4 Tablet Oral PRN Brunilda Corpus, DO        dextrose (D50W) injection syrg 12.5-25 g  12.5-25 g IntraVENous PRN Al, Darline, DO        glucagon (GLUCAGEN) injection 1 mg  1 mg IntraMUSCular PRN Brunilda Corpus, DO            No Known Allergies    Social History     Tobacco Use    Smoking status: Former Smoker    Smokeless tobacco: Never Used   Substance Use Topics    Alcohol use: Yes     Alcohol/week: 70.0 standard drinks     Types: 70 Cans of beer per week    Drug use: Never       Family History   Problem Relation Age of Onset    Diabetes Mother     Hypertension Father        Review of Systems  Pertinent positives and negatives as noted. Examination  Visit Vitals  BP (!) 158/86 (BP 1 Location: Right upper arm, BP Patient Position: At rest)   Pulse 73   Temp 98 °F (36.7 °C)   Resp 17   Ht 6' 1\" (1.854 m)   Wt 101 kg (222 lb 10.6 oz)   SpO2 95%   BMI 29.38 kg/m²     Neurologically, he is awake, alert, oriented with normal speech, language, and cognition. Cranial nerves intact 2-12. He has normal bulk and tone. There is no abnormal movement. There is no pronation or drift. He is able to generate full strength in the upper and lower extremities and all muscle groups to direct confrontational testing. Reflexes are symmetrical in the upper and lower extremities bilaterally. No pathologic reflexes are elicited. He has no ataxia or past pointing. Impression/Plan  57-year-old gentleman with an episode of presyncope  This was not a TIA  His neurological examination is normal  His vascular imaging is normal  His cranial imaging is normal  We will have him undergo EEG to be complete  Once the EEG is completed no further neurologic evaluation necessary  We will defer to internal medicine any further evaluation from their standpoint  We will follow up as needed    [place] MD Adele          This note was created using voice recognition software. Despite editing, there may be syntax errors.

## 2021-11-23 NOTE — ED NOTES
Verbal shift change report given to [address] [facility] [address] (oncoming nurse) by [facility] RN (offgoing nurse). Report included the following information SBAR, Kardex, ED Summary, [facility] and Recent Results.

## 2021-11-23 NOTE — DISCHARGE SUMMARY
Physician Discharge Summary     Patient ID:  Mario Cho  [id number]  24 y.o.  1965    Admit date: 11/22/2021    Discharge date and time: 11/23/2021    Admission Diagnoses: Right facial numbness [R20.0]    Discharge Diagnoses: Active Problems:    Speech abnormality (11/22/2021)      Weakness (11/22/2021)      Alcohol abuse (11/22/2021)      Left facial numbness (11/22/2021)           Hospital Course:   Mr. Sarah Jaramillo is a 60 y.o.   male past medical history of diabetes, hypertension and alcohol abuse who is admitted for stroke rule out. Mr. 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 He also endorses feeling lightheaded and bilateral weakness in his lower extremities. He denied any syncopal episodes, denies any chest pain, denies any focal weakness. He does endorse to drinking 8-12 beers daily. He states his last drink was last night. He endorses some mild tremors, mild headache; no hallucinations. He was admitted for further evaluation and treatment of the following:       Left facial numbness (11/22/2021): He underwent MRI brain and was evaluated by Neurology. Also underwent EEG. Studies normal and CVA/TIA deemed unlikely. More likely related to EtOH. Alcohol abuse (11/22/2021): endorses 8-12 beers daily. Last drink about 24 hours pta; now with mild sx. Started phenobarbital     HTN: Cont home med      Diabetes: cont home metormin. ISS.      PCP: Michelle Mcneil MD     Consults: Neurology    Condition of patient at discharge: good and improved    Discharge Exam:    Physical Exam:    Gen: Well-developed, well-nourished, in no acute distress  HEENT:  Pink conjunctivae, PERRL, hearing intact to voice, moist mucous membranes  Neck: Supple, without masses, thyroid non-tender  Resp: No accessory muscle use, clear breath sounds without wheezes rales or rhonchi  Card: No murmurs, normal S1, S2 without thrills, bruits or peripheral edema  Abd:  Soft, non-tender, non-distended, normoactive bowel sounds are present, no palpable organomegaly and no detectable hernias  Lymph:  No cervical or inguinal adenopathy  Musc: No cyanosis or clubbing  Skin: No rashes or ulcers, skin turgor is good  Neuro:  Cranial nerves are grossly intact, no focal motor weakness, follows commands appropriately  Psych:  Good insight, oriented to person, place and time, alert          Disposition: home    Patient Instructions:   Current Discharge Medication List      CONTINUE these medications which have NOT CHANGED    Details   metFORMIN (GLUCOPHAGE) 500 mg tablet Take 500 mg by mouth daily (with breakfast). lisinopriL (PRINIVIL, ZESTRIL) 10 mg tablet Take 10 mg by mouth daily. metoprolol succinate (TOPROL-XL) 100 mg tablet Take 100 mg by mouth daily. Activity: Activity as tolerated  Diet: Regular Diet  Wound Care: None needed    Follow-up with Terry Rosas MD in 1 week. Follow-up tests/labs none    Approximate time spent in patient care on day of discharge: 20 min    Signed:   Vega Cardenas MD  11/23/2021  4:41 PM

## 2021-11-23 NOTE — ED NOTES
TRANSFER - OUT REPORT:    Verbal report given to [facility] (name) on Sheila Haq  being transferred to [facility](unit) for routine progression of care       Report consisted of patients Situation, Background, Assessment and   Recommendations(SBAR). Information from the following report(s) SBAR, Kardex, ED Summary, Procedure Summary and Intake/Output was reviewed with the receiving nurse. Lines:   Peripheral IV 11/22/21 Left Antecubital (Active)       Peripheral IV 11/22/21 Right Antecubital (Active)        Opportunity for questions and clarification was provided.       Patient transported with:   Monitor  Tech

## 2021-11-23 NOTE — PROGRESS NOTES
Bedside and Verbal shift change report given to 108 Deanna Cristobal RN (oncoming nurse) by Dyan Vasquez RN (offgoing nurse). Report included the following information SBAR, Kardex, Intake/Output, MAR, Accordion and Recent Results. This patient was assisted with Intentional Toileting every 2 hours during this shift as appropriate. Documentation of ambulation and output reflected on Flowsheet as appropriate. Purposeful hourly rounding was completed using AIDET and 5Ps. Outcomes of PHR documented as they occurred. Bed alarm in use as appropriate. Dual Suction and ambubag in place. 1754 - Discharge paperwork reviewed with patient and patient verbalized understanding. Paperwork signed and placed on chart at this time.

## 2021-11-23 NOTE — PROGRESS NOTES
OCCUPATIONAL THERAPY EVALUATION/DISCHARGE  Patient: Samir Álvarez (19 y.o. male)  Date: 11/23/2021  Primary Diagnosis: Right facial numbness [R20.0]       Precautions: universal       ASSESSMENT  Based on the objective data described below, the patient presents with good overall activity tolerance following admission on 11/22 for R facial numbness and slurred speech. Neurological work-up has been negative for acute process thus far. Patient reports all symptoms have resolved aside from mild L facial numbness. Patient performed transfers and ADLs without LOB or physical assistance needed. Patient was educated on the signs and symptoms of stroke and stroke risk factors; He confirmed understanding. Patient confirms he lives with his wife and has assistance if needed at home. Patient has no further skilled OT needs and is cleared from OT services at this time. Current Level of Function (ADLs/self-care): Patient was independent with ADLs and functional mobility PTA. Functional Outcome Measure: The patient scored 66/66 on the Fugl-Coy Assessment of Upper Extremity outcome measure. PLAN :    Recommendation for discharge: (in order for the patient to meet his/her long term goals)  No skilled occupational therapy/ follow up rehabilitation needs identified at this time. This discharge recommendation:  Has been made in collaboration with the attending provider and/or case management    IF patient discharges home will need the following DME: none       SUBJECTIVE:   Patient was agreeable to OT evaluation. OBJECTIVE DATA SUMMARY:   HISTORY:   Past Medical History:   Diagnosis Date    [facility]ism ([facility] [address]     Diabetes ([facility] [address]     HTN (hypertension)      Past Surgical History:   Procedure Laterality Date    HX OTHER SURGICAL      Right sided jaw surgery       Prior Level of Function/Environment/Context: Patient lives with his wife.     Expanded or extensive additional review of patient history: Home Situation  Home Environment: Private residence  # Steps to Enter: 1  Rails to Enter: No  One/Two Story Residence: One story  Living Alone: No  Support Systems: Spouse/Significant Other  Patient Expects to be Discharged to[de-identified] House  Current DME Used/Available at Home: None    Hand dominance: Right    EXAMINATION OF PERFORMANCE DEFICITS:  Cognitive/Behavioral Status:  Neurologic State: Alert  Orientation Level: Oriented X4  Cognition: Follows commands  Perception: Appears intact  Perseveration: No perseveration noted  Safety/Judgement: Awareness of environment    Skin: Intact in the uppers    Edema: None noted in the uppers    Hearing: Auditory  Auditory Impairment: None    Vision/Perceptual:    Tracking: Able to track stimulus in all quadrants w/o difficulty    Diplopia: No    Acuity: Within Defined Limits       Range of Motion:  AROM: Within functional limits in the uppers  PROM: Within functional limits in the uppers    Strength:  Strength: Within functional limits in the uppers     Coordination:  Fine Motor Skills-Upper: Left Intact; Right Intact    Gross Motor Skills-Upper: Left Intact;  Right Intact    Tone & Sensation:  Tone: Normal  Sensation: Intact    Balance:  Sitting: Intact  Standing: Intact    Functional Mobility and Transfers for ADLs:  Bed Mobility:  Supine to Sit: Independent  Sit to Supine: Independent  Scooting: Independent    Transfers:  Sit to Stand: Independent  Stand to Sit: Independent  Bed to Chair: Independent  Bathroom Mobility: Independent  Toilet Transfer : Independent    ADL Assessment:  Feeding: Independent    Oral Facial Hygiene/Grooming: Independent    Bathing: Independent    Upper Body Dressing: Independent    Lower Body Dressing: Independent    Toileting: Independent    Cognitive Retraining  Safety/Judgement: Awareness of environment    Functional Measure:  Fugl-Coy Assessment of Motor Recovery after Stroke:     Reflex Activity  Flexors/Biceps/Fingers: Can be elicited  Extensors/Triceps: Can be elicited  Reflex Subtotal: 4    Volitional Movement Within Synergies  Shoulder Retraction: Full  Shoulder Elevation: Full  Shoulder Abduction (90 degrees): Full  Shoulder External Rotation: Full  Elbow Flexion: Full  Forearm Supination: Full  Shoulder Adduction/Internal Rotation: Full  Elbow Extension: Full  Forearm Pronation: Full  Subtotal: 18    Volitional Movement Mixing Synergies  Hand to Lumbar Spine: Full  Shoulder Flexion (0-90 degrees): Full  Pronation-Supination: Full  Subtotal: 6    Volitional Movement With Little or No Synergy  Shoulder Abduction (0-90 degrees): Full  Shoulder Flexion ( degrees): Full  Pronation/Supination: Full  Subtotal : 6    Normal Reflex Activity  Biceps, Triceps, Finger Flexors: Full  Subtotal : 2    Upper Extremity Total   Upper Extremity Total: 36    Wrist  Stability at 15 Degree Dorsiflexion: Full  Repeated Dorsiflexion/ Volar Flexion: Full  Stability at 15 Degree Dorsiflexion: Full  Repeated Dorsiflexion/ Volar Flexion: Full  Circumduction: Full  Wrist Total: 10    Hand  Mass Flexion: Full  Mass Extension: Full  Grasp A: Full  Grasp B: Full  Grasp C: Full  Grasp D: Full  Grasp E: Full  Hand Total: 14    Coordination/Speed  Tremor: None  Dysmetria: None  Time: <1s  Coordination/Speed Total : 6    Total A-D  Total A-D (Motor Function): 66/66         This is a reliable/valid measure of arm function after a neurological event. It has established value to characterize functional status and for measuring spontaneous and therapy-induced recovery; tests proximal and distal motor functions. Fugl-Coy Assessment  UE scores recorded between five and 30 days post neurologic event can be used to predict UE recovery at six months post neurologic event. Severe = 0-21 points   Moderately Severe = 22-33 points   Moderate = 34-47 points   Mild = 48-66 points  ANNE-MARIE Deleon, DUKE Gates, & JAVIER Luther (1992).  Measurement of motor recovery after stroke: Outcome assessment and sample size requirements. Stroke, 23, pp. 1401-9077.   --------------------------------------------------------------------------------------------------------------------------------------------------------------------  MCID:  Stroke:   Otto Luke et al, 2001; n = 171; mean age 79 (6) years; assessed within 16 (12) days of stroke, Acute Stroke)  FMA Motor Scores from Admission to Discharge   10 point increase in FMA Upper Extremity = 1.5 change in discharge FIM   10 point increase in FMA Lower Extremity = 1.9 change in discharge FIM  MDC:   Stroke:   Elizabeth Tolbert et al, 2008, n = 14, mean age = 59.9 (14.6) years, assessed on average 14 (6.5) months post stroke, Chronic Stroke)   FMA = 5.2 points for the Upper Extremity portion of the assessment       Occupational Therapy Evaluation Charge Determination   History Examination Decision-Making   LOW Complexity : Brief history review  LOW Complexity : 1-3 performance deficits relating to physical, cognitive , or psychosocial skils that result in activity limitations and / or participation restrictions  LOW Complexity : No comorbidities that affect functional and no verbal or physical assistance needed to complete eval tasks       Based on the above components, the patient evaluation is determined to be of the following complexity level: LOW     Activity Tolerance:   Good    After treatment patient left in no apparent distress:    Sitting in chair, Call bell within reach and Bed / chair alarm activated    COMMUNICATION/EDUCATION:   The patients plan of care was discussed with: Physical therapist, Registered nurse and patient. .     Thank you for this referral.  MELISSA Ellis/REGI  Time Calculation: 36 mins

## 2021-11-23 NOTE — PROGRESS NOTES
Patient admitted for evaluation and treatment of TIA/stroke, passed dysphagia screening, will DC n.p.o. and advance to cardiac diet.

## 2021-11-23 NOTE — PROGRESS NOTES
PHYSICAL THERAPY EVALUATION/DISCHARGE  Patient: Arlene Howard (3 y.o. male)  Date: 11/23/2021  Primary Diagnosis: Right facial numbness [R20.0]       Precautions: None         ASSESSMENT  Based on the objective data described below, the patient presents near his functional baseline, reporting to therapist that symptoms that lead to admission, LUE and LLE numbness and weakness, have largely resolved. He reports that only remaining symptom in L facial numbness. No strength or sensation changes noted in BLE or BUE and pt was able to perform all aspects of mobility with supervision/indepence at this time. Additional, he scored a 53/56 on the Burgess indicating that he is at a low risk for falls. Pt has no additional acute or follow up PT needs and is safe to dc home with wife once medically stable. Functional Outcome Measure: The patient scored 53/56 on the Burgess Balance Test outcome measure which is indicative of low risk for falls. Other factors to consider for discharge:      Further skilled acute physical therapy is not indicated at this time. PLAN :  Recommendation for discharge: (in order for the patient to meet his/her long term goals)  No skilled physical therapy/ follow up rehabilitation needs identified at this time. This discharge recommendation:  Has been made in collaboration with the attending provider and/or case management    IF patient discharges home will need the following DME: none       SUBJECTIVE:   Patient stated I don't know why it happened.     OBJECTIVE DATA SUMMARY:   HISTORY:    Past Medical History:   Diagnosis Date    Alcoholism ([facility] [address]     Diabetes ([facility] [address]     HTN (hypertension)      Past Surgical History:   Procedure Laterality Date    HX OTHER SURGICAL      Right sided jaw surgery       Prior level of function: independent and active  Personal factors and/or comorbidities impacting plan of care:     Home Situation  Home Environment: Private residence  # Steps to Enter: 1  Rails to Enter: No  One/Two Story Residence: One story  Living Alone: No  Support Systems: Spouse/Significant Other  Patient Expects to be Discharged to[de-identified] House  Current DME Used/Available at Home: None    EXAMINATION/PRESENTATION/DECISION MAKING:   Critical Behavior:  Neurologic State: Alert, Appropriate for age  Orientation Level: Appropriate for age, Oriented X4  Cognition: Appropriate decision making, Appropriate for age attention/concentration, Appropriate safety awareness, Follows commands     Hearing: Auditory  Auditory Impairment: None  Skin:  Defer to RN notes  Edema: Defer to RN notes  Range Of Motion:  AROM: Within functional limits           PROM: Within functional limits           Strength:    Strength:  Within functional limits                    Tone & Sensation:   Tone: Normal              Sensation: Intact               Coordination:  Coordination: Within functional limits  Vision:      Functional Mobility:  Bed Mobility:     Supine to Sit: Independent     Scooting: Independent  Transfers:  Sit to Stand: Supervision  Stand to Sit: Supervision                       Balance:   Sitting: Intact  Standing: Intact  Ambulation/Gait Training:              Gait Description (WDL): Within defined limits         Functional Measure:  Burgess Balance Test:    Sitting to Standin  Standing Unsupported: 4  Sitting with Back Unsupported: 4  Standing to Sittin  Transfers: 4  Standing Unsupported with Eyes Closed: 4  Standing Unsupported with Feet Together: 4  Reach Forward with Outstretched Arm: 4   Object: 4  Turn to Look Over Shoulders: 4  Turn 360 Degrees: 4  Alternate Foot on Step/Stool: 4  Standing Unsupported One Foot in Front: 3  Stand on One Le  Total: 53/56         56=Maximum possible score;   0-20=High fall risk  21-40=Moderate fall risk   41-56=Low fall risk                  Physical Therapy Evaluation Charge Determination   History Examination Presentation Decision-Making   LOW Complexity : Zero comorbidities / personal factors that will impact the outcome / POC LOW Complexity : 1-2 Standardized tests and measures addressing body structure, function, activity limitation and / or participation in recreation  LOW Complexity : Stable, uncomplicated  LOW Complexity : FOTO score of       Based on the above components, the patient evaluation is determined to be of the following complexity level: LOW     Pain Rating:  Denied pain    Activity Tolerance:   WNL and Good      After treatment patient left in no apparent distress:   Sitting in chair and Call bell within reach    COMMUNICATION/EDUCATION:   The patients plan of care was discussed with: Registered nurse. Fall prevention education was provided and the patient/caregiver indicated understanding. and Patient/family have participated as able in goal setting and plan of care.     Thank you for this referral.  Joe Chavarria PT, DPT   Time Calculation: 25 mins

## 2021-11-23 NOTE — PROGRESS NOTES
Reason for Admission:  Left-sided facial numbness                   RUR Score:    6%                 Plan for utilizing home health:    Not indicated      PCP: First and Last name:  Gay Hughes MD   Name of Practice:    Are you a current patient: Yes/No: yes   Approximate date of last visit: 2019   Can you participate in a virtual visit with your PCP: no                    Current Advanced Directive/Advance Care Plan: Full Code    Healthcare Decision Maker:   Padmini Arevalo, spouse - [phone]                      Transition of Care Plan:                    CM met with patient to complete initial assessment and begin discharge planning. Patient lives with his wife at charted address in [place], [place]. 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 He has no prior HH hx and does not own/use any DME. Patient's preferred pharmacy is [facility] in [place]. 1. CM to follow  2. Neurology consulted - neuro exam was normal.  Patient to have EEG. 3. PT/OT/SLP consulted  4. Home with family when stable  5. Outpatient follow-up    Care Management Interventions  PCP Verified by CM:  Yes  Transition of Care Consult (CM Consult): Discharge Planning  Physical Therapy Consult: Yes  Occupational Therapy Consult: Yes  Speech Therapy Consult: Yes  Support Systems: Spouse/Significant Other  Confirm Follow Up Transport: Family  The Plan for Transition of Care is Related to the Following Treatment Goals : Left-sided facial numbness  Discharge Location  Discharge Placement: Home with outpatient services     Elie Sacks, LCSW

## 2021-11-23 NOTE — PROGRESS NOTES
[facility]: MED RECONCILIATION      Medications added:   All- no medications were listed prior to admission      Information obtained from: Patient, spouse, refill records from [address] [place] [address]: Patient has no known allergies. Prior to Admission Medications:     Medication Documentation Review Audit       Reviewed by Estuardo Irving, PHARMD (Pharmacist) on 11/22/21 at 1809      Medication Sig Documenting Provider Last Dose Status Taking?   lisinopriL (PRINIVIL, ZESTRIL) 10 mg tablet Take 10 mg by mouth daily. Provider, Historical  Active Yes   metFORMIN (GLUCOPHAGE) 500 mg tablet Take 500 mg by mouth daily (with breakfast). Provider, Historical  Active Yes   metoprolol succinate (TOPROL-XL) 100 mg tablet Take 100 mg by mouth daily. Provider, Historical  Active Yes                      Irene Pina.  Winston Villegas

## 2021-11-23 NOTE — PROGRESS NOTES
TRANSFER - IN REPORT:    Verbal report received from [facility], [address] on [address]  being received from ED for routine progression of care      Report consisted of patients Situation, Background, Assessment and   Recommendations(SBAR). Information from the following report(s) SBAR, Intake/Output, MAR, Recent Results, Med Rec Status and Cardiac Rhythm . was reviewed with the receiving nurse. Opportunity for questions and clarification was provided. Assessment completed upon patients arrival to unit and care assumed. Stroke Education provided to patient and the following topics were discussed    1. Patients personal risk factors for stroke are hypertension and diabetes mellitus    2. Warning signs of Stroke:        * Sudden numbness or weakness of the face, arm or leg, especially on one side of          The body            * Sudden confusion, trouble speaking or understanding        * Sudden trouble seeing in one or both eyes        * Sudden trouble walking, dizziness, loss of balance or coordination        * Sudden severe headache with no known cause      3. Importance of activation Emergency Medical Services ( 9-1-1 ) immediately if experience any warning signs of stroke. 4. Be sure and schedule a follow-up appointment with your primary care doctor or any specialists as instructed. 5. You must take medicine every day to treat your risk factors for stroke. Be sure to take your medicines exactly as your doctor tells you: no more, no less. Know what your medicines are for , what they do. Anti-thrombotics /anticoagulants can help prevent strokes. You are taking the following medicine(s) None    6. Smoking and second-hand smoke greatly increase your risk of stroke, cardiovascular disease and death. Smoking history Former smoker    7. Information provided was [facility] Stroke Education Binder, Stroke Handouts or Verbal Education    8.  Documentation of teaching completed in Patient Education Activity and on Care Plan with teaching response noted? yes    This patient was assisted with intentional toileting every 2 hours during this shift as appropriate. Documentation of ambulation and output reflected on flow sheet as appropriate. Purposeful hourly rounding was completed using AIDET and 5 Ps. Outcomes of PHR documented as they occurred. Bed alarm in use as appropriate. Dual suction and ambubag in place. 0700 - Bedside and verbal shift change report given to Zoe Ramsey RN (oncoming nurse) by Pietro Mensah RN (offgoing nurse). Report included the following information: SBAR, Kardex, Intake/Output, MAR, Recent Results, and Med Rec Status.

## 2021-11-23 NOTE — PROGRESS NOTES
Speech pathology note  Reviewed chart and note patient admitted with speech changes with concern for CVA. Note CT showed No evidence of acute intracranial abnormality and MRI showed No acute intracranial abnormality. Note patient passed the STAND and a regular diet was ordered. NIHSS=0. Discussed case with RN who reported no SLP-related concerns. Introduced self and role of SLP to patient. Patient specifically reported that he has returned to baseline, including motor speech, language, and cognitive function. Patient Ox4. Formal SLP evaluation not clinically indicated at this time. Will sign off. Please re-consult if further needs arise. Thank you.     Donna Louie., CCC-SLP

## 2021-11-24 LAB
ATRIAL RATE: 85 BPM
CALCULATED P AXIS, ECG09: 46 DEGREES
CALCULATED R AXIS, ECG10: -21 DEGREES
CALCULATED T AXIS, ECG11: 114 DEGREES
DIAGNOSIS, 93000: NORMAL
P-R INTERVAL, ECG05: 158 MS
Q-T INTERVAL, ECG07: 420 MS
QRS DURATION, ECG06: 154 MS
QTC CALCULATION (BEZET), ECG08: 499 MS
VENTRICULAR RATE, ECG03: 85 BPM

## 2021-11-24 NOTE — PROCEDURES
[facility] [place] 77  EEG    Name:  Garrison Braga  MR#:  [id number]  :  1965  ACCOUNT #:  [de-identified]  DATE OF SERVICE:  2021      REQUESTING PROVIDER:  Tobi Buenrostro MD    CLINICAL HISTORY:  An EEG is requested on this 43-year-old man to evaluate for epileptiform abnormalities. MEDICATIONS:  Ami Matas to include Glucophage, Prinivil, Toprol. EEG REPORT:  This tracing is obtained during the awake state. During wakefulness, there are intermittent runs of posteriorly dominant and symmetrical low-to-medium amplitude 10 cycle per second activities which attenuate with eye opening. Lower voltage faster frequency activities are seen symmetrically over the anterior head regions. Hyperventilation not performed. Intermittent photic stimulation induces symmetric posterior driving responses. Sleep is not attained.     INTERPRETATION:  This EEG recorded during the awake state is normal.      Oscar Huber MD [id number]  D:  2021 16:39  T:  2021 18:08  JOB #:  [id number]

## 2022-03-18 PROBLEM — F10.10 ALCOHOL ABUSE: Status: ACTIVE | Noted: 2021-11-22

## 2022-03-18 PROBLEM — R47.9 SPEECH ABNORMALITY: Status: ACTIVE | Noted: 2021-11-22

## 2022-03-19 PROBLEM — R53.1 WEAKNESS: Status: ACTIVE | Noted: 2021-11-22

## 2022-03-19 PROBLEM — R20.0 LEFT FACIAL NUMBNESS: Status: ACTIVE | Noted: 2021-11-22

## 2023-05-15 RX ORDER — METOPROLOL SUCCINATE 100 MG/1
100 TABLET, EXTENDED RELEASE ORAL DAILY
COMMUNITY

## 2023-05-15 RX ORDER — LISINOPRIL 10 MG/1
10 TABLET ORAL DAILY
COMMUNITY